# Patient Record
Sex: MALE | Race: WHITE | NOT HISPANIC OR LATINO | Employment: OTHER | URBAN - METROPOLITAN AREA
[De-identification: names, ages, dates, MRNs, and addresses within clinical notes are randomized per-mention and may not be internally consistent; named-entity substitution may affect disease eponyms.]

---

## 2017-02-27 RX ORDER — BENAZEPRIL HYDROCHLORIDE 40 MG/1
TABLET ORAL
Qty: 90 TAB | Refills: 0 | Status: SHIPPED | OUTPATIENT
Start: 2017-02-27 | End: 2017-05-15

## 2017-03-06 RX ORDER — BENAZEPRIL HYDROCHLORIDE 40 MG/1
TABLET ORAL
Qty: 90 TAB | Refills: 1 | Status: SHIPPED | OUTPATIENT
Start: 2017-03-06 | End: 2017-05-15

## 2017-03-06 RX ORDER — LEVOTHYROXINE SODIUM 175 UG/1
TABLET ORAL
Qty: 90 TAB | Refills: 1 | Status: SHIPPED | OUTPATIENT
Start: 2017-03-06 | End: 2017-05-15

## 2017-03-06 NOTE — TELEPHONE ENCOUNTER
Was the patient seen in the last year in this department? Yes   9/16    Does patient have an active prescription for medications requested? No     Received Request Via: Pharmacy

## 2017-03-20 RX ORDER — HYDROCHLOROTHIAZIDE 25 MG/1
TABLET ORAL
Qty: 90 TAB | Refills: 0 | Status: SHIPPED | OUTPATIENT
Start: 2017-03-20 | End: 2017-07-07 | Stop reason: SDUPTHER

## 2017-03-31 NOTE — Clinical Note
April 5, 2017        Lee Echols  [address] [facility] [address]        Dear Lee:    We have been unsuccessful reaching you by phone. We have approved your prescription for alprazolam for 30 days. Please call [phone]      If you have any questions or concerns, please don't hesitate to call.        Sincerely,        SARAH Chau.    Electronically Signed

## 2017-04-03 RX ORDER — ALPRAZOLAM 1 MG/1
TABLET ORAL
Qty: 30 TAB | Refills: 0 | Status: SHIPPED
Start: 2017-04-03 | End: 2017-12-24 | Stop reason: SDUPTHER

## 2017-04-05 NOTE — TELEPHONE ENCOUNTER
Phone number wrong in chart, deleted. Will mail letter to pt regarding appointment needed prior to refills

## 2017-05-15 ENCOUNTER — PATIENT OUTREACH (OUTPATIENT)
Dept: HEALTH INFORMATION MANAGEMENT | Facility: OTHER | Age: 72
End: 2017-05-15

## 2017-05-15 NOTE — PROGRESS NOTES
Outbound call to patient for medication reconciliation.  Updated allergy and medication lists.    Patient demonstrates adherence to medication schedule and understanding of indications for medications.    Meds that meet Beer's criteria for potentially inappropriate use in patients over 65 include: alprazolam.  Patient states he takes this medication on an average of once every 2 weeks.  Denies dizziness, drowsiness, or recent falls.  Encouraged patient to minimize use and to look to d/c this medication if any problems with cognitive impairment or increased fall risk.    Patient denies any side effects from medications. He stopped taking atorvastatin due to muscle pain.  States he did not experience muscle pain while taking Crestor.  Is amenable to starting Crestor again.    Has appropriate medication regimen for current disease states.  Patient does not monitor BP at home.      Patient feels satisfied with medication regimen.      Patient can afford medications.    Patient does not use tobacco products or consume alcohol.    Does not exercise regularly.  Encouraged him to do so at least 3 times weekly.    Educated patient on s/sx of high and low BP and when to monitor.  Patient understands when to seek medical attention.    Plan:  Patient to speak with PCP at upcoming office visit regarding restarting statin therapy.

## 2017-07-07 ENCOUNTER — OFFICE VISIT (OUTPATIENT)
Dept: MEDICAL GROUP | Facility: PHYSICIAN GROUP | Age: 72
End: 2017-07-07
Payer: MEDICARE

## 2017-07-07 ENCOUNTER — HOSPITAL ENCOUNTER (OUTPATIENT)
Dept: LAB | Facility: MEDICAL CENTER | Age: 72
End: 2017-07-07
Attending: NURSE PRACTITIONER
Payer: MEDICARE

## 2017-07-07 VITALS
BODY MASS INDEX: 30.21 KG/M2 | SYSTOLIC BLOOD PRESSURE: 112 MMHG | HEART RATE: 68 BPM | RESPIRATION RATE: 12 BRPM | DIASTOLIC BLOOD PRESSURE: 70 MMHG | OXYGEN SATURATION: 97 % | HEIGHT: 69 IN | WEIGHT: 204 LBS | TEMPERATURE: 97.7 F

## 2017-07-07 DIAGNOSIS — I10 ESSENTIAL HYPERTENSION: ICD-10-CM

## 2017-07-07 DIAGNOSIS — N52.9 ERECTILE DYSFUNCTION, UNSPECIFIED ERECTILE DYSFUNCTION TYPE: ICD-10-CM

## 2017-07-07 DIAGNOSIS — E78.5 HYPERLIPIDEMIA, UNSPECIFIED HYPERLIPIDEMIA TYPE: ICD-10-CM

## 2017-07-07 DIAGNOSIS — E55.9 VITAMIN D DEFICIENCY: ICD-10-CM

## 2017-07-07 DIAGNOSIS — G47.00 INSOMNIA, UNSPECIFIED TYPE: ICD-10-CM

## 2017-07-07 DIAGNOSIS — E07.9 THYROID DISEASE: ICD-10-CM

## 2017-07-07 DIAGNOSIS — R68.82 LOW LIBIDO: ICD-10-CM

## 2017-07-07 LAB
25(OH)D3 SERPL-MCNC: 23 NG/ML (ref 30–100)
ALBUMIN SERPL BCP-MCNC: 3.9 G/DL (ref 3.2–4.9)
ALBUMIN/GLOB SERPL: 1.4 G/DL
ALP SERPL-CCNC: 60 U/L (ref 30–99)
ALT SERPL-CCNC: 24 U/L (ref 2–50)
ANION GAP SERPL CALC-SCNC: 3 MMOL/L (ref 0–11.9)
AST SERPL-CCNC: 26 U/L (ref 12–45)
BILIRUB SERPL-MCNC: 1.3 MG/DL (ref 0.1–1.5)
BUN SERPL-MCNC: 17 MG/DL (ref 8–22)
CALCIUM SERPL-MCNC: 9.2 MG/DL (ref 8.5–10.5)
CHLORIDE SERPL-SCNC: 105 MMOL/L (ref 96–112)
CHOLEST SERPL-MCNC: 170 MG/DL (ref 100–199)
CO2 SERPL-SCNC: 23 MMOL/L (ref 20–33)
CREAT SERPL-MCNC: 1.06 MG/DL (ref 0.5–1.4)
GFR SERPL CREATININE-BSD FRML MDRD: >60 ML/MIN/1.73 M 2
GLOBULIN SER CALC-MCNC: 2.8 G/DL (ref 1.9–3.5)
GLUCOSE SERPL-MCNC: 94 MG/DL (ref 65–99)
HDLC SERPL-MCNC: 38 MG/DL
LDLC SERPL CALC-MCNC: 109 MG/DL
POTASSIUM SERPL-SCNC: 3.8 MMOL/L (ref 3.6–5.5)
PROT SERPL-MCNC: 6.7 G/DL (ref 6–8.2)
SODIUM SERPL-SCNC: 131 MMOL/L (ref 135–145)
T4 FREE SERPL-MCNC: 1.09 NG/DL (ref 0.53–1.43)
TRIGL SERPL-MCNC: 114 MG/DL (ref 0–149)
TSH SERPL DL<=0.005 MIU/L-ACNC: 0.12 UIU/ML (ref 0.3–3.7)

## 2017-07-07 PROCEDURE — 84443 ASSAY THYROID STIM HORMONE: CPT

## 2017-07-07 PROCEDURE — 80061 LIPID PANEL: CPT

## 2017-07-07 PROCEDURE — 82306 VITAMIN D 25 HYDROXY: CPT

## 2017-07-07 PROCEDURE — 99214 OFFICE O/P EST MOD 30 MIN: CPT | Performed by: NURSE PRACTITIONER

## 2017-07-07 PROCEDURE — 36415 COLL VENOUS BLD VENIPUNCTURE: CPT

## 2017-07-07 PROCEDURE — 84439 ASSAY OF FREE THYROXINE: CPT

## 2017-07-07 PROCEDURE — 80053 COMPREHEN METABOLIC PANEL: CPT

## 2017-07-07 RX ORDER — HYDROCHLOROTHIAZIDE 25 MG/1
TABLET ORAL
Qty: 90 TAB | Refills: 1 | Status: SHIPPED | OUTPATIENT
Start: 2017-07-07 | End: 2017-12-20 | Stop reason: SDUPTHER

## 2017-07-07 RX ORDER — SILDENAFIL 100 MG/1
100 TABLET, FILM COATED ORAL PRN
Qty: 10 TAB | Refills: 3 | Status: SHIPPED
Start: 2017-07-07 | End: 2018-04-06 | Stop reason: SDUPTHER

## 2017-07-07 NOTE — MR AVS SNAPSHOT
"        Lee FALCON Onesimo   2017 9:20 AM   Office Visit   MRN: [id number]    Department:  [facility]   Dept Phone:  [phone]    Description:  Male : 1945   Provider:  LORENZA Chau           Allergies as of 2017     No Known Allergies      You were diagnosed with     Insomnia, unspecified type   [8680186]       Essential hypertension   [5746763]       Vitamin D deficiency   [3934116]       Hyperlipidemia, unspecified hyperlipidemia type   [2975839]       Low libido   [043707]       Erectile dysfunction, unspecified erectile dysfunction type   [9939020]       Thyroid disease   [316535]         Vital Signs     Blood Pressure Pulse Temperature Respirations Height Weight    112/70 mmHg 68 36.5 °C (97.7 °F) 12 1.753 m (5' 9.02\") 92.534 kg (204 lb)    Body Mass Index Oxygen Saturation Smoking Status             30.11 kg/m2 97% Never Smoker          Basic Information     Date Of Birth Sex Race Ethnicity Preferred Language    1945 Male White Non- English      Problem List              ICD-10-CM Priority Class Noted - Resolved    Insomnia G47.00 High  Unknown - Present    Hypertension I10 High  Unknown - Present    Thyroid disease E07.9 High  Unknown - Present    Low libido R68.82 Low  7/10/2014 - Present    CRI (chronic renal insufficiency) N18.9 High  2014 - Present    Vitamin D deficiency E55.9 High  2014 - Present    Lumbar disc herniation with radiculopathy M51.16 Low  2015 - Present    Hyperlipidemia E78.5   1/15/2016 - Present    Wellness examination Z00.00   2016 - Present      Health Maintenance        Date Due Completion Dates    IMM DTaP/Tdap/Td Vaccine (1 - Tdap) 1965 ---    IMM ZOSTER VACCINE 2006 ---    IMM PNEUMOCOCCAL 65+ (ADULT) LOW/MEDIUM RISK SERIES (1 of 2 - PCV13) 2011 ---    IMM INFLUENZA (1) 2017 ---    COLONOSCOPY 7/15/2019 7/15/2014            Current Immunizations     No immunizations on file.      Below and/or " attached are the medications your provider expects you to take. Review all of your home medications and newly ordered medications with your provider and/or pharmacist. Follow medication instructions as directed by your provider and/or pharmacist. Please keep your medication list with you and share with your provider. Update the information when medications are discontinued, doses are changed, or new medications (including over-the-counter products) are added; and carry medication information at all times in the event of emergency situations     Allergies:  No Known Allergies          Medications  Valid as of: July 07, 2017 - 10:33 AM    Generic Name Brand Name Tablet Size Instructions for use    ALPRAZolam (Tab) XANAX 1 MG TAKE ONE TABLET BY MOUTH AT BEDTIME AS NEEDED        AmLODIPine Besylate (Tab) NORVASC 5 MG Take 1 Tab by mouth every day.        Benazepril HCl (Tab) LOTENSIN 40 MG Take 1 Tab by mouth every day.        Colchicine (Tab) COLCRYS 0.6 MG Take 1 Tab by mouth 2 times a day.        Gabapentin (Cap) NEURONTIN 300 MG Take 1 Cap by mouth 3 times a day.        HydroCHLOROthiazide (Tab) HYDRODIURIL 25 MG TAKE ONE TABLET BY MOUTH ONCE DAILY        Indomethacin   Take  by mouth.        Levothyroxine Sodium (Tab) SYNTHROID 175 MCG Take 1 Tab by mouth every day.        Sildenafil Citrate (Tab) VIAGRA 100 MG Take 1 Tab by mouth as needed for Erectile Dysfunction.        TraMADol HCl   Take  by mouth.        .                 Medicines prescribed today were sent to:     [facility] [address] [place] [address] - [address] [place] [id number]    Phone: [phone] Fax: [phone]    Open 24 Hours?: No      Medication refill instructions:       If your prescription bottle indicates you have medication refills left, it is not necessary to call your provider’s office. Please contact your pharmacy and they will refill your medication.    If your prescription bottle indicates you  do not have any refills left, you may request refills at any time through one of the following ways: The online [employer] system (except Urgent Care), by calling your provider’s office, or by asking your pharmacy to contact your provider’s office with a refill request. Medication refills are processed only during regular business hours and may not be available until the next business day. Your provider may request additional information or to have a follow-up visit with you prior to refilling your medication.   *Please Note: Medication refills are assigned a new Rx number when refilled electronically. Your pharmacy may indicate that no refills were authorized even though a new prescription for the same medication is available at the pharmacy. Please request the medicine by name with the pharmacy before contacting your provider for a refill.        Your To Do List     Future Labs/Procedures Complete By Expires    COMP METABOLIC PANEL  As directed 7/7/2018    VITAMIN D,25 HYDROXY  As directed 7/8/2018         [employer] Access Code: Activation code not generated  Current [employer] Status: Active

## 2017-07-07 NOTE — PROGRESS NOTES
No chief complaint on file.      HISTORY OF PRESENT ILLNESS: Patient is a @ age 71 here  today to discuss:    Interval history:     Hospitalizations NO     Injuries NO     Illness  NO     Review of Systems   Constitutional: Negative for fever, chills, weight loss and malaise/fatigue.   HENT: Negative for ear pain, nosebleeds, congestion, sore throat and neck pain.    Eyes: Negative for blurred vision.   Respiratory: Negative for cough, sputum production, shortness of breath and wheezing.    Cardiovascular: Negative for chest pain, palpitations, orthopnea and leg swelling.   Gastrointestinal: Negative for heartburn, nausea, vomiting and abdominal pain.   Genitourinary: Negative for dysuria, urgency and frequency.   Musculoskeletal: Negative for myalgias, back pain and joint pain.   Skin: Negative for rash and itching.   Neurological: Negative for dizziness, tingling, tremors, sensory change, focal weakness and headaches.   Endo/Heme/Allergies: Does not bruise/bleed easily.   Psychiatric/Behavioral: Negative for depression, anxiety, or memory loss.   Patient states that he has been more anxious and stress due to one of his adult children's health.          Insomnia  Patient has chronic insomnia. Patient reports chronic problems with this over time. He can get to sleep but his sleep is never really good quality we discussed some options. At one time he was taking Xanax that supplements his ability to sleep and doesn't feel hung over.     Hypertension  Patient has history of htn. Denies chest pain, shortness of breath, syncope headache or blurred vision. Patient is due for labs for kidney function and obtaining new prescription.     Vitamin D deficiency  Patient has history of vitamin D supplementation. We'll check     Hyperlipidemia  Patient has known Hyperlipidemia. Patient manages it with diet.    Low libido  Patient continue to have ED. He supplements with Medication.     Thyroid disease  Patient has chronic  "hypothyroidism. Patient needs to update her labs. Patient is on 175 mcg. no mental fog. No changes in skin, hair, nails.      Allergies:Review of patient's allergies indicates no known allergies.    Current Outpatient Prescriptions Ordered in [facility]   Medication Sig Dispense Refill   • TRAMADOL HCL PO Take  by mouth.     • INDOMETHACIN PO Take  by mouth.     • hydrochlorothiazide (HYDRODIURIL) 25 MG Tab TAKE ONE TABLET BY MOUTH ONCE DAILY 90 Tab 1   • sildenafil citrate (VIAGRA) 100 MG tablet Take 1 Tab by mouth as needed for Erectile Dysfunction. 10 Tab 3   • alprazolam (XANAX) 1 MG Tab TAKE ONE TABLET BY MOUTH AT BEDTIME AS NEEDED 30 Tab 0   • colchicine (COLCRYS) 0.6 MG Tab Take 1 Tab by mouth 2 times a day. 60 Tab 1   • benazepril (LOTENSIN) 40 MG tablet Take 1 Tab by mouth every day. 90 Tab 3   • amlodipine (NORVASC) 5 MG Tab Take 1 Tab by mouth every day. 90 Tab 0   • levothyroxine (SYNTHROID) 175 MCG Tab Take 1 Tab by mouth every day. 90 Tab 3   • gabapentin (NEURONTIN) 300 MG CAPS Take 1 Cap by mouth 3 times a day. 90 Cap 3     No current Epic-ordered facility-administered medications on file.       Past Medical History   Diagnosis Date   • Insomnia    • Hypertension      on meds   • Thyroid disease      on meds       Social History   Substance Use Topics   • Smoking status: Never Smoker    • Smokeless tobacco: Never Used   • Alcohol Use: No       Family Status   Relation Status Death Age   • Mother Alive    • Father       Family History   Problem Relation Age of Onset   • Cancer Father        ROS: As documented in my HPI      Exam:  Blood pressure 112/70, pulse 68, temperature 36.5 °C (97.7 °F), resp. rate 12, height 1.753 m (5' 9.02\"), weight 92.534 kg (204 lb), SpO2 97 %.  General:  Well nourished, well developed male in NAD  Head: Nontender scalp. No lesions  Skin: Some mild hyperpigmentation  Neck: Supple. Symmetric Thyroid palpated. No bruits  Pulmonary:  Normal effort. No rales, ronchi, or " wheezing.  Cardiovascular: Regular rate and rhythm without murmur.   Abdomen: Soft nontender, normal bowel sounds  Extremities: no clubbing, cyanosis, or edema.  Psych: Alert and oriented x3.  Neurological: No focal deficits    Please note that this dictation was created using voice recognition software. I have made every reasonable attempt to correct obvious errors, but I expect that there are errors of grammar and possibly content that I did not discover before finalizing the note.    Assessment/Plan:  1. Insomnia, unspecified type - chronic problem fair control  COMP METABOLIC PANEL    LIPID PANEL    VITAMIN D,25 HYDROXY    TSH+FREE T4   2. Essential hypertension - Current status of condition is chronic and controlled on therapy.   COMP METABOLIC PANEL    LIPID PANEL    VITAMIN D,25 HYDROXY    TSH+FREE T4   3. Vitamin D deficiency - unknown will recheck labs  COMP METABOLIC PANEL    LIPID PANEL    VITAMIN D,25 HYDROXY    TSH+FREE T4   4. Hyperlipidemia, unspecified hyperlipidemia type -unknown status recheck labs  COMP METABOLIC PANEL    LIPID PANEL    VITAMIN D,25 HYDROXY    TSH+FREE T4   5. Low libido - Current status of condition is chronic and controlled on therapy.   sildenafil citrate (VIAGRA) 100 MG tablet         Thyroid disease  Current status of condition is chronic and controlled on therapy.  Recheck lab      Patient will be due for his colonoscopy screen next year he may reconsidered we discussed different options which would include the stool test.

## 2017-09-05 RX ORDER — LEVOTHYROXINE SODIUM 175 UG/1
TABLET ORAL
Qty: 90 TAB | Refills: 1 | Status: SHIPPED | OUTPATIENT
Start: 2017-09-05 | End: 2018-03-01 | Stop reason: SDUPTHER

## 2017-09-05 NOTE — TELEPHONE ENCOUNTER
Was the patient seen in the last year in this department? Yes     Does patient have an active prescription for medications requested? No     Received Request Via: Pharmacy      Pt met protocol?: Yes, OV and TSH checked 7/17

## 2017-09-05 NOTE — TELEPHONE ENCOUNTER
Patient was last seen by PCP 7-7-17. Last TSH read 0.120 (7-7-17). Will refill for 6 months.  Elgin Ruiz, PharmD

## 2017-09-10 RX ORDER — BENAZEPRIL HYDROCHLORIDE 40 MG/1
TABLET ORAL
Qty: 90 TAB | Refills: 1 | Status: SHIPPED | OUTPATIENT
Start: 2017-09-10 | End: 2018-05-27 | Stop reason: SDUPTHER

## 2017-09-11 NOTE — TELEPHONE ENCOUNTER
Refill X 6 months, sent to pharmacy.Pt. Seen in the last 6 months per protocol.   Lab Results   Component Value Date/Time    SODIUM 131 (L) 07/07/2017 10:10 AM    POTASSIUM 3.8 07/07/2017 10:10 AM    CHLORIDE 105 07/07/2017 10:10 AM    CO2 23 07/07/2017 10:10 AM    GLUCOSE 94 07/07/2017 10:10 AM    BUN 17 07/07/2017 10:10 AM    CREATININE 1.06 07/07/2017 10:10 AM

## 2017-12-20 RX ORDER — HYDROCHLOROTHIAZIDE 25 MG/1
TABLET ORAL
Qty: 90 TAB | Refills: 1 | Status: SHIPPED | OUTPATIENT
Start: 2017-12-20 | End: 2018-05-27 | Stop reason: SDUPTHER

## 2017-12-24 DIAGNOSIS — G47.00 INSOMNIA, UNSPECIFIED TYPE: ICD-10-CM

## 2017-12-27 RX ORDER — ALPRAZOLAM 1 MG/1
TABLET ORAL
Qty: 30 TAB | Refills: 0 | Status: SHIPPED
Start: 2017-12-27 | End: 2018-08-09 | Stop reason: SDUPTHER

## 2018-03-06 ENCOUNTER — PATIENT OUTREACH (OUTPATIENT)
Dept: HEALTH INFORMATION MANAGEMENT | Facility: OTHER | Age: 73
End: 2018-03-06

## 2018-03-06 NOTE — PROGRESS NOTES
1. Attempt #: 1 * did not speak with patient*    2. [facility] Verified: yes    3. Verify PCP: yes    4. Care Team Updated:       •   DME Company (gait device, O2, CPAP, etc.): YES       •   Other Specialists (eye doctor, derm, GYN, cardiology, endo, etc): N\A    5.  Reviewed/Updated the following with patient:       •   Communication Preference Obtained? NO       •   Preferred Pharmacy? NO       •   Preferred Lab? NO       •   Family History (document living status of immediate family members and if + hx of cancer, diabetes, hypertension, hyperlipidemia, heart attack, stroke) NO    6. [employer] Activation: already active    7. [employer] Scott: no    8. Annual Wellness Visit Scheduling  Scheduling Status:Scheduled      9. Care Gap Scheduling (Attempt to Schedule EACH Overdue Care Gap!)     Health Maintenance Due   Topic Date Due   • IMM DTaP/Tdap/Td Vaccine (1 - Tdap) 09/22/1965   • IMM ZOSTER VACCINE  09/22/2006   • IMM PNEUMOCOCCAL 65+ (ADULT) LOW/MEDIUM RISK SERIES (1 of 2 - PCV13) 09/22/2011   • Annual Wellness Visit  07/11/2015   • IMM INFLUENZA (1) 09/01/2017        Scheduled patient for Annual Wellness Visit    10. Patient was advised: “This is a free wellness visit. The provider will screen for medical conditions to help you stay healthy. If you have other concerns to address you may be asked to discuss these at a separate visit or there may be an additional fee.”     11. Patient was informed to arrive 15 min prior to their scheduled appointment and bring in their medication bottles.

## 2018-04-06 ENCOUNTER — OFFICE VISIT (OUTPATIENT)
Dept: MEDICAL GROUP | Facility: PHYSICIAN GROUP | Age: 73
End: 2018-04-06
Payer: MEDICARE

## 2018-04-06 ENCOUNTER — HOSPITAL ENCOUNTER (OUTPATIENT)
Dept: LAB | Facility: MEDICAL CENTER | Age: 73
End: 2018-04-06
Attending: NURSE PRACTITIONER
Payer: MEDICARE

## 2018-04-06 VITALS
HEART RATE: 71 BPM | BODY MASS INDEX: 28.92 KG/M2 | WEIGHT: 202 LBS | SYSTOLIC BLOOD PRESSURE: 112 MMHG | TEMPERATURE: 97.6 F | DIASTOLIC BLOOD PRESSURE: 64 MMHG | HEIGHT: 70 IN | OXYGEN SATURATION: 97 % | RESPIRATION RATE: 14 BRPM

## 2018-04-06 DIAGNOSIS — R68.82 LOW LIBIDO: ICD-10-CM

## 2018-04-06 DIAGNOSIS — E07.9 THYROID DISEASE: ICD-10-CM

## 2018-04-06 DIAGNOSIS — E55.9 VITAMIN D DEFICIENCY: ICD-10-CM

## 2018-04-06 DIAGNOSIS — E78.5 HYPERLIPIDEMIA, UNSPECIFIED HYPERLIPIDEMIA TYPE: ICD-10-CM

## 2018-04-06 DIAGNOSIS — I10 ESSENTIAL HYPERTENSION: ICD-10-CM

## 2018-04-06 DIAGNOSIS — Z79.891 CHRONIC USE OF OPIATE DRUGS THERAPEUTIC PURPOSES: ICD-10-CM

## 2018-04-06 DIAGNOSIS — G47.00 INSOMNIA, UNSPECIFIED TYPE: ICD-10-CM

## 2018-04-06 DIAGNOSIS — M51.16 LUMBAR DISC HERNIATION WITH RADICULOPATHY: ICD-10-CM

## 2018-04-06 DIAGNOSIS — M10.09 IDIOPATHIC GOUT OF MULTIPLE SITES, UNSPECIFIED CHRONICITY: ICD-10-CM

## 2018-04-06 DIAGNOSIS — Z00.00 WELLNESS EXAMINATION: ICD-10-CM

## 2018-04-06 LAB
25(OH)D3 SERPL-MCNC: 17 NG/ML (ref 30–100)
ALBUMIN SERPL BCP-MCNC: 4.7 G/DL (ref 3.2–4.9)
ALBUMIN/GLOB SERPL: 1.6 G/DL
ALP SERPL-CCNC: 53 U/L (ref 30–99)
ALT SERPL-CCNC: 30 U/L (ref 2–50)
ANION GAP SERPL CALC-SCNC: 10 MMOL/L (ref 0–11.9)
AST SERPL-CCNC: 28 U/L (ref 12–45)
BILIRUB SERPL-MCNC: 1.4 MG/DL (ref 0.1–1.5)
BUN SERPL-MCNC: 19 MG/DL (ref 8–22)
CALCIUM SERPL-MCNC: 10.5 MG/DL (ref 8.5–10.5)
CHLORIDE SERPL-SCNC: 103 MMOL/L (ref 96–112)
CHOLEST SERPL-MCNC: 182 MG/DL (ref 100–199)
CO2 SERPL-SCNC: 24 MMOL/L (ref 20–33)
CREAT SERPL-MCNC: 1.27 MG/DL (ref 0.5–1.4)
GLOBULIN SER CALC-MCNC: 3 G/DL (ref 1.9–3.5)
GLUCOSE SERPL-MCNC: 98 MG/DL (ref 65–99)
HDLC SERPL-MCNC: 49 MG/DL
LDLC SERPL CALC-MCNC: 111 MG/DL
POTASSIUM SERPL-SCNC: 3.9 MMOL/L (ref 3.6–5.5)
PROT SERPL-MCNC: 7.7 G/DL (ref 6–8.2)
SODIUM SERPL-SCNC: 137 MMOL/L (ref 135–145)
TRIGL SERPL-MCNC: 110 MG/DL (ref 0–149)

## 2018-04-06 PROCEDURE — G0439 PPPS, SUBSEQ VISIT: HCPCS | Performed by: NURSE PRACTITIONER

## 2018-04-06 PROCEDURE — 80053 COMPREHEN METABOLIC PANEL: CPT

## 2018-04-06 PROCEDURE — 80061 LIPID PANEL: CPT

## 2018-04-06 PROCEDURE — 36415 COLL VENOUS BLD VENIPUNCTURE: CPT

## 2018-04-06 PROCEDURE — 82306 VITAMIN D 25 HYDROXY: CPT

## 2018-04-06 RX ORDER — AMLODIPINE BESYLATE 5 MG/1
5 TABLET ORAL DAILY
Qty: 90 TAB | Refills: 1 | Status: SHIPPED
Start: 2018-04-06 | End: 2018-08-09 | Stop reason: SDUPTHER

## 2018-04-06 RX ORDER — COLCHICINE 0.6 MG/1
0.6 TABLET ORAL 2 TIMES DAILY
Qty: 180 TAB | Refills: 1 | Status: SHIPPED
Start: 2018-04-06 | End: 2018-08-09 | Stop reason: SDUPTHER

## 2018-04-06 RX ORDER — ALPRAZOLAM 1 MG/1
1 TABLET ORAL NIGHTLY PRN
COMMUNITY
End: 2018-08-09

## 2018-04-06 RX ORDER — TRAMADOL HYDROCHLORIDE 50 MG/1
TABLET ORAL
Qty: 60 TAB | Refills: 0 | Status: SHIPPED | OUTPATIENT
Start: 2018-04-06 | End: 2018-06-06

## 2018-04-06 RX ORDER — SILDENAFIL 100 MG/1
100 TABLET, FILM COATED ORAL PRN
Qty: 10 TAB | Refills: 3 | Status: SHIPPED
Start: 2018-04-06 | End: 2018-08-09 | Stop reason: SDUPTHER

## 2018-04-06 ASSESSMENT — ACTIVITIES OF DAILY LIVING (ADL): BATHING_REQUIRES_ASSISTANCE: 0

## 2018-04-06 ASSESSMENT — PATIENT HEALTH QUESTIONNAIRE - PHQ9: CLINICAL INTERPRETATION OF PHQ2 SCORE: 0

## 2018-04-06 NOTE — ASSESSMENT & PLAN NOTE
Patient has occasionally uses Tramadol for his back pain.   Patient notices that when he lifts. Patient would like to restart having pain medication. This patient is continuing to use a controlled substance (CS) on a long term basis.  The patient is thoroughly aware of the goals of treatment with the CS  The patient is aware that yearly and random urine drug screens are required.  The patient has been instructed to take the CS only as prescribed.  The patient is prohibited from sharing the CS with any other person.  The patient is instructed to inform the provider if any other CS is taken, of any alcohol or cannabis or other recreational drug use, any treatment for side effects of the CS or complications, if they have CS active rx in other states  The patient has evidence for a reason for the CS  The treatment plan has been discussed with the patient  The  report has been reviewed    Chronic pain recheck:   Last dose of controlled substance: 2 days ago   Chronic pain treated with  Tramadol 50 mg  taken once every 3  days    He  reports that he does not drink alcohol.  He  reports that he does not use drugs.    Consequences of Chronic Opiate therapy:  (5 A's)  Analgesia: Compared to no treatment or prior treatment, pain is currently significantly improved  Activity: significantly improved  Adverse Events: He denies constipation, dry mouth, itchy skin, nausea and sedation  Aberrant Behaviors: He reports he is taking medication as prescribed and is not veering from agreed treatment regimen. There have been no inappropriate refills or lost/stolen meds reported.   Affect/Mood: Pain is not impacting patient's mood.  Patient denies depression/anxiety.    Nonnarcotic treatments that are being used: muscle relaxers.   Treatment goals discussed.      Interpretation of Opioid Risk Score   Score 0-3 = Low risk of abuse. Do UDS at least once per year.  Score 4-7 = Moderate risk of abuse. Do UDS 1-4 times per year.  Score 8+ =  High risk of abuse. Refer to specialist.    No order of CONTROLLED SUBSTANCE TREATMENT AGREEMENT is found.     UDS Summary     Patient has no health maintenance due at this time        Patient to obtain UDS today.     I have reviewed the medical records, the Prescription Monitoring Program and I have determined that controlled substance treatment is medically indicated.

## 2018-04-06 NOTE — PROGRESS NOTES
Chief Complaint   Patient presents with   • Annual Exam         HPI:  Lee is a 72 y.o. here for Medicare Annual Wellness Visit    Patient initially here for wellness exam but needs to obtain refills on medication for hypertension, gout, and ED. He also would like to restart his chronic use of opioids for therapeutic purposes in regard of his low back pain and history of herniated disks.    Insomnia  Patient has chronic insomnia. He manages insomnia with Xanax the past that he will no longer take this that he is chosen to use tramadol for pain relief. Patient counseled on the increase of accidental overdose with the combination of benzoyl at diet is a PA and narcotics.    Hypertension  Patient has chronic hypertension and takes Norvasc 5 mg, Lotensin 40, and hydrochlorothiazide 25. No chest pain. No shortness of breath. No peripheral edema. Is due for chemistry and lipid profile.    Vitamin D deficiency  Patient has a history of vitamin D insufficiency. Patient supplements.    Hyperlipidemia  Patient has chronic hyperlipidemia. He currently is not on a statin. Will check lipid profile.    Low libido  Patient has chronic ED.    Lumbar disc herniation with radiculopathy  Patient has occasionally uses Tramadol for his back pain.   Patient notices that when he lifts. Patient would like to restart having pain medication. This patient is continuing to use a controlled substance (CS) on a long term basis.  The patient is thoroughly aware of the goals of treatment with the CS  The patient is aware that yearly and random urine drug screens are required.  The patient has been instructed to take the CS only as prescribed.  The patient is prohibited from sharing the CS with any other person.  The patient is instructed to inform the provider if any other CS is taken, of any alcohol or cannabis or other recreational drug use, any treatment for side effects of the CS or complications, if they have CS active rx in other  states  The patient has evidence for a reason for the CS  The treatment plan has been discussed with the patient  The  report has been reviewed    Chronic pain recheck:   Last dose of controlled substance: 2 days ago   Chronic pain treated with  Tramadol 50 mg  taken once every 3  days    He  reports that he does not drink alcohol.  He  reports that he does not use drugs.    Consequences of Chronic Opiate therapy:  (5 A's)  Analgesia: Compared to no treatment or prior treatment, pain is currently significantly improved  Activity: significantly improved  Adverse Events: He denies constipation, dry mouth, itchy skin, nausea and sedation  Aberrant Behaviors: He reports he is taking medication as prescribed and is not veering from agreed treatment regimen. There have been no inappropriate refills or lost/stolen meds reported.   Affect/Mood: Pain is not impacting patient's mood.  Patient denies depression/anxiety.    Nonnarcotic treatments that are being used: muscle relaxers.   Treatment goals discussed.      Interpretation of Opioid Risk Score    0  Score 0-3 = Low risk of abuse. Do UDS at least once per year.  Score 4-7 = Moderate risk of abuse. Do UDS 1-4 times per year.  Score 8+ = High risk of abuse. Refer to specialist.    No order of CONTROLLED SUBSTANCE TREATMENT AGREEMENT is found.     UDS Summary     Patient has no health maintenance due at this time        Patient to obtain UDS today.     I have reviewed the medical records, the Prescription Monitoring Program and I have determined that controlled substance treatment is medically indicated.            Patient Active Problem List    Diagnosis Date Noted   • CRI (chronic renal insufficiency) 08/25/2014     Priority: High   • Vitamin D deficiency 08/25/2014     Priority: High   • Insomnia      Priority: High   • Hypertension      Priority: High   • Thyroid disease      Priority: High   • Lumbar disc herniation with radiculopathy 06/08/2015     Priority: Low    • Low libido 07/10/2014     Priority: Low   • Wellness examination 09/06/2016   • Hyperlipidemia 01/15/2016       Current Outpatient Prescriptions   Medication Sig Dispense Refill   • levothyroxine (SYNTHROID) 175 MCG Tab TAKE ONE TABLET BY MOUTH ONCE DAILY 90 Tab 0   • hydrochlorothiazide (HYDRODIURIL) 25 MG Tab TAKE ONE TABLET BY MOUTH ONCE DAILY 90 Tab 1   • benazepril (LOTENSIN) 40 MG tablet TAKE ONE TABLET BY MOUTH ONCE DAILY 90 Tab 1   • TRAMADOL HCL PO Take  by mouth.     • INDOMETHACIN PO Take  by mouth.     • sildenafil citrate (VIAGRA) 100 MG tablet Take 1 Tab by mouth as needed for Erectile Dysfunction. 10 Tab 3   • colchicine (COLCRYS) 0.6 MG Tab Take 1 Tab by mouth 2 times a day. 60 Tab 1   • amlodipine (NORVASC) 5 MG Tab Take 1 Tab by mouth every day. 90 Tab 0   • gabapentin (NEURONTIN) 300 MG CAPS Take 1 Cap by mouth 3 times a day. 90 Cap 3     No current facility-administered medications for this visit.         Patient is taking medications as noted in medication list.  Current supplements as per medication list.     Allergies: Patient has no known allergies.    Current social contact/activities: family friends/   Patient's perception of their health: good    Is patient current with immunizations?NO denied all immunizations    He  reports that he has never smoked. He has never used smokeless tobacco. He reports that he does not drink alcohol or use drugs.  Counseling given: Not Answered        DPA/Advanced directive: Patient has Advanced Directive, but it is not on file. Instructed to bring in a copy to scan into their chart.    ROS:    Gait: Uses no assistive device   Ostomy: no   Other tubes: no   Amputations: no   Chronic oxygen use no   Last eye exam declined   Wears hearing aids: no   : Denies any urinary leakage during the last 6 months      Screening:        Depression Screening    Little interest or pleasure in doing things?  0 - not at all  Feeling down, depressed, or hopeless? 0 - not  at all  Patient Health Questionnaire Score: 0    If depressive symptoms identified deferred to follow up visit unless specifically addressed in assessment and plan.    Interpretation of PHQ-9 Total Score   Score Severity   1-4 No Depression   5-9 Mild Depression   10-14 Moderate Depression   15-19 Moderately Severe Depression   20-27 Severe Depression    Screening for Cognitive Impairment    Three Minute Recall (apple, watch, dalton)  3/3    Draw clock face with all 12 numbers set to the hand to show 10 minutes past 11 o'clock  1    If cognitive concerns identified, deferred for follow up unless specifically addressed in assessment and plan.    Fall Risk Assessment    Has the patient had two or more falls in the last year or any fall with injury in the last year?  No  If fall risk identified, deferred for follow up unless specifically addressed in assessment and plan.    Safety Assessment    Throw rugs on floor.  No  Handrails on all stairs.  No  Good lighting in all hallways.  Yes  Difficulty hearing.  No  Patient counseled about all safety risks that were identified.    Functional Assessment ADLs    Are there any barriers preventing you from cooking for yourself or meeting nutritional needs?  No.    Are there any barriers preventing you from driving safely or obtaining transportation?  No.    Are there any barriers preventing you from using a telephone or calling for help?  No.    Are there any barriers preventing you from shopping?  No.    Are there any barriers preventing you from taking care of your own finances?  No.    Are there any barriers preventing you from managing your medications?  No.    Are there any barriers preventing you from showering, bathing or dressing yourself?  No.    Are you currently engaging any exercise or physical activity?  Yes.       Health Maintenance Summary                IMM DTaP/Tdap/Td Vaccine Overdue 9/22/1965     IMM ZOSTER VACCINE Overdue 9/22/2006     IMM PNEUMOCOCCAL 65+  (ADULT) LOW/MEDIUM RISK SERIES Overdue 9/22/2011     Annual Wellness Visit Overdue 7/11/2015      Done 7/10/2014     IMM INFLUENZA Next Due 9/1/2018     COLONOSCOPY Next Due 7/15/2019      Done 7/15/2014 AMB REFERRAL TO GI FOR COLONOSCOPY          Patient Care Team:  LORENZA Chau as PCP - General (Family Medicine)    Social History   Substance Use Topics   • Smoking status: Never Smoker   • Smokeless tobacco: Never Used   • Alcohol use No     Family History   Problem Relation Age of Onset   • Cancer Father      He  has a past medical history of Hypertension; Insomnia; and Thyroid disease.   No past surgical history on file.        Exam:     There were no vitals taken for this visit. There is no height or weight on file to calculate BMI.    Hearing good.    Dentition good  Alert, oriented in no acute distress.  Eye contact is good, speech goal directed, affect calm      Assessment and Plan. The following treatment and monitoring plan is recommended:    1. Insomnia, unspecified type  LIPID PROFILE    COMP METABOLIC PANEL    VITAMIN D,25 HYDROXY    TSH+FREE T4   2. Essential hypertension  LIPID PROFILE    COMP METABOLIC PANEL    VITAMIN D,25 HYDROXY    TSH+FREE T4   3. Vitamin D deficiency  LIPID PROFILE    COMP METABOLIC PANEL    VITAMIN D,25 HYDROXY    TSH+FREE T4   4. Hyperlipidemia, unspecified hyperlipidemia type  LIPID PROFILE    COMP METABOLIC PANEL    VITAMIN D,25 HYDROXY    TSH+FREE T4   5. Low libido  LIPID PROFILE    COMP METABOLIC PANEL    VITAMIN D,25 HYDROXY    TSH+FREE T4         Services suggested: No services needed at this time  Health Care Screening: Age-appropriate preventive services recommended by USPTF and ACIP covered by Medicare were discussed today. Services ordered if indicated and agreed upon by the patient.  Referrals offered: PT/OT/Nutrition counseling/Behavioral Health/Smoking cessation as per orders if indicated.    Discussion today about general wellness and lifestyle habits:     · Prevent falls and reduce trip hazards; Cautioned about securing or removing rugs.  · Have a working fire alarm and carbon monoxide detector;   · Engage in regular physical activity and social activities       Follow-up: Patient to have urine drug screen. Return to clinic for future refills on tramadol. Obtain labs. Will need to select a new provider as I'm retiring.

## 2018-07-11 NOTE — TELEPHONE ENCOUNTER
Was the patient seen in the last year in this department? Yes     Does patient have an active prescription for medications requested? No     Received Request Via: Pharmacy      Pt met protocol?: No pt last ov 4/18 was asked to have labs done last janine   TSH   Date Value Ref Range Status   07/07/2017 0.120 (L) 0.300 - 3.700 uIU/mL Final

## 2018-07-12 RX ORDER — LEVOTHYROXINE SODIUM 175 UG/1
TABLET ORAL
Qty: 15 TAB | Refills: 0 | Status: SHIPPED | OUTPATIENT
Start: 2018-07-12 | End: 2018-07-23 | Stop reason: SDUPTHER

## 2018-07-12 NOTE — TELEPHONE ENCOUNTER
Last seen by PCP 4/18. Will send #15 to pharmacy. Last fill. Must get labs done for thyroid. MA, Please call patient and remind him to get Thyroid labs done.

## 2018-07-17 ENCOUNTER — TELEPHONE (OUTPATIENT)
Dept: MEDICAL GROUP | Facility: CLINIC | Age: 73
End: 2018-07-17

## 2018-07-17 DIAGNOSIS — E07.9 THYROID DISEASE: ICD-10-CM

## 2018-07-17 NOTE — TELEPHONE ENCOUNTER
I am retired from [facility]. He needs to follow up with new PCP.  Last visit was April 2018.   What medications does he want refilled.   Marie Nathan

## 2018-07-17 NOTE — TELEPHONE ENCOUNTER
Pt is requesting all meds to be refilled and sent to [facility].   [facility] phone number: [phone]  Fax: [phone]    Pt phone: [phone]

## 2018-07-23 RX ORDER — LEVOTHYROXINE SODIUM 175 UG/1
TABLET ORAL
Qty: 30 TAB | Refills: 1 | Status: SHIPPED | OUTPATIENT
Start: 2018-07-23 | End: 2018-08-15 | Stop reason: SDUPTHER

## 2018-07-23 NOTE — TELEPHONE ENCOUNTER
Will send Rx to pharmacy. Please contact patient and have him get thyroid labs done prior to appointment, looks like this wasn't obtained by the lab as part of the April labs that kobi ordered.

## 2018-08-09 ENCOUNTER — OFFICE VISIT (OUTPATIENT)
Dept: MEDICAL GROUP | Facility: PHYSICIAN GROUP | Age: 73
End: 2018-08-09
Payer: MEDICARE

## 2018-08-09 VITALS
WEIGHT: 201.8 LBS | RESPIRATION RATE: 16 BRPM | OXYGEN SATURATION: 96 % | HEART RATE: 73 BPM | HEIGHT: 70 IN | SYSTOLIC BLOOD PRESSURE: 108 MMHG | DIASTOLIC BLOOD PRESSURE: 58 MMHG | TEMPERATURE: 97.6 F | BODY MASS INDEX: 28.89 KG/M2

## 2018-08-09 DIAGNOSIS — Z79.899 CHRONIC PRESCRIPTION BENZODIAZEPINE USE: ICD-10-CM

## 2018-08-09 DIAGNOSIS — Z87.39 HISTORY OF GOUT: ICD-10-CM

## 2018-08-09 DIAGNOSIS — E07.9 THYROID DISEASE: ICD-10-CM

## 2018-08-09 DIAGNOSIS — R68.82 LOW LIBIDO: ICD-10-CM

## 2018-08-09 DIAGNOSIS — M10.09 IDIOPATHIC GOUT OF MULTIPLE SITES, UNSPECIFIED CHRONICITY: ICD-10-CM

## 2018-08-09 DIAGNOSIS — G47.00 INSOMNIA, UNSPECIFIED TYPE: ICD-10-CM

## 2018-08-09 DIAGNOSIS — I10 ESSENTIAL HYPERTENSION: ICD-10-CM

## 2018-08-09 DIAGNOSIS — M51.16 LUMBAR DISC HERNIATION WITH RADICULOPATHY: ICD-10-CM

## 2018-08-09 DIAGNOSIS — Z79.891 CHRONIC USE OF OPIATE DRUGS THERAPEUTIC PURPOSES: ICD-10-CM

## 2018-08-09 PROCEDURE — 99214 OFFICE O/P EST MOD 30 MIN: CPT | Performed by: NURSE PRACTITIONER

## 2018-08-09 RX ORDER — SILDENAFIL 100 MG/1
100 TABLET, FILM COATED ORAL PRN
Qty: 10 TAB | Refills: 3 | Status: SHIPPED | OUTPATIENT
Start: 2018-08-09

## 2018-08-09 RX ORDER — GABAPENTIN 300 MG/1
300 CAPSULE ORAL 3 TIMES DAILY
Qty: 90 CAP | Refills: 3 | Status: SHIPPED | OUTPATIENT
Start: 2018-08-09

## 2018-08-09 RX ORDER — TRAMADOL HYDROCHLORIDE 50 MG/1
TABLET ORAL
Qty: 60 TAB | Refills: 0 | Status: CANCELLED | OUTPATIENT
Start: 2018-08-09 | End: 2018-10-09

## 2018-08-09 RX ORDER — COLCHICINE 0.6 MG/1
0.6 TABLET ORAL 2 TIMES DAILY
Qty: 30 TAB | Refills: 1 | Status: SHIPPED
Start: 2018-08-09 | End: 2018-08-09 | Stop reason: SDUPTHER

## 2018-08-09 RX ORDER — COLCHICINE 0.6 MG/1
0.6 TABLET ORAL 2 TIMES DAILY
Qty: 30 TAB | Refills: 1 | Status: SHIPPED | OUTPATIENT
Start: 2018-08-09 | End: 2019-02-05 | Stop reason: SDUPTHER

## 2018-08-09 RX ORDER — AMLODIPINE BESYLATE 5 MG/1
5 TABLET ORAL DAILY
Qty: 90 TAB | Refills: 1 | Status: SHIPPED
Start: 2018-08-09 | End: 2018-08-09 | Stop reason: SDUPTHER

## 2018-08-09 RX ORDER — AMLODIPINE BESYLATE 5 MG/1
5 TABLET ORAL DAILY
Qty: 90 TAB | Refills: 1 | Status: SHIPPED | OUTPATIENT
Start: 2018-08-09 | End: 2019-07-10 | Stop reason: SDUPTHER

## 2018-08-09 RX ORDER — SILDENAFIL 100 MG/1
100 TABLET, FILM COATED ORAL PRN
Qty: 10 TAB | Refills: 3 | Status: SHIPPED
Start: 2018-08-09 | End: 2018-08-09 | Stop reason: SDUPTHER

## 2018-08-09 RX ORDER — ALPRAZOLAM 1 MG/1
TABLET ORAL
Qty: 30 TAB | Refills: 0 | Status: SHIPPED | OUTPATIENT
Start: 2018-08-09 | End: 2018-09-09

## 2018-08-09 NOTE — ASSESSMENT & PLAN NOTE
Chronic health problem.  Usually manages with sleep hygiene.  Will occasionally take alprazolam as needed.  Last prescription for alprazolam was for 30 tabs 8 months ago.  Patient is requesting a refill today.  Patient also takes tramadol occasionally for lower back pain.  Patient knows not to take narcotic with benzodiazepine and knows to avoid alcohol while taking either medication.

## 2018-08-09 NOTE — PROGRESS NOTES
CC: Hypertension, insomnia, thyroid    HISTORY OF THE PRESENT ILLNESS: Patient is a 72 y.o. male. This pleasant patient is here today for evaluation, management, medication refills for insomnia, hypertension, hypothyroid, erectile dysfunction.  Patient is new to me.  Previous primary care provider was Marie WESLEY.      Insomnia  Chronic health problem.  Usually manages with sleep hygiene.  Will occasionally take alprazolam as needed.  Last prescription for alprazolam was for 30 tabs 8 months ago.  Patient is requesting a refill today.  Patient also takes tramadol occasionally for lower back pain.  Patient knows not to take narcotic with benzodiazepine and knows to avoid alcohol while taking either medication.    Hypertension  This is a chronic health problem that is well controlled with current medications and lifestyle measures.  Patient takes benazepril 40 mg, amlodipine 5 mg, and hydrochlorothiazide 25 mg daily.  Patient is requesting refill for amlodipine, needing a paper prescription as he mails prescription.The patient denies chest pain, shortness of breath, headache, epistaxis, vision changes, or dyspnea on exertion.  Tolerating medication, denies lightheadedness, cough, pedal edema.    Thyroid disease  This is a chronic health problem for patient, uncertain of control.  Last TSH was over a year ago and was low at 0.120.  He takes levothyroxine 175 mcg daily.  Will get updated TSH prior to next refill.    Low libido  Chronic ED.  Takes sildenafil.  Requesting refill.  Gets medication through mail-in pharmacy.    History of gout  Patient reports this is a chronic health problem.  Will take colchicine with gout flares.  Patient reports last gout flare was couple months ago.  Patient is requesting refill of colchicine.    Lumbar disc herniation with radiculopathy  This is a chronic health problem that is well controlled with current medications and lifestyle measures. Pain is in lower back.  Able to remain  active.  Patient takes tramadol 50 mg once every 3-4 days and gabapentin 300 mg daily.  Patient is requesting a refill of gabapentin.  He has been taking his wife's gabapentin as he ran out.  Patient last saw spine specialist at [facility] neurosurgery  3 years ago.      Allergies: Patient has no known allergies.    Current Outpatient Prescriptions Ordered in [facility]   Medication Sig Dispense Refill   • gabapentin (NEURONTIN) 300 MG Cap Take 1 Cap by mouth 3 times a day. 90 Cap 3   • ALPRAZolam (XANAX) 1 MG Tab TAKE ONE TABLET BY MOUTH AT BEDTIME AS NEEDED 30 Tab 0   • amLODIPine (NORVASC) 5 MG Tab Take 1 Tab by mouth every day. 90 Tab 1   • sildenafil citrate (VIAGRA) 100 MG tablet Take 1 Tab by mouth as needed for Erectile Dysfunction. 10 Tab 3   • colchicine (COLCRYS) 0.6 MG Tab Take 1 Tab by mouth 2 times a day. 30 Tab 1   • levothyroxine (SYNTHROID) 175 MCG Tab TAKE 1 TABLET BY MOUTH ONCE DAILY 30 Tab 1   • hydroCHLOROthiazide (HYDRODIURIL) 25 MG Tab TAKE ONE TABLET BY MOUTH ONCE DAILY 90 Tab 1   • benazepril (LOTENSIN) 40 MG tablet TAKE ONE TABLET BY MOUTH ONCE DAILY 90 Tab 1   • TRAMADOL HCL PO Take  by mouth.     • INDOMETHACIN PO Take  by mouth.       No current Epic-ordered facility-administered medications on file.        Past Medical History:   Diagnosis Date   • Hypertension     on meds   • Insomnia    • Thyroid disease     on meds       No past surgical history on file.    Social History   Substance Use Topics   • Smoking status: Never Smoker   • Smokeless tobacco: Never Used   • Alcohol use No       Family History   Problem Relation Age of Onset   • Cancer Father        ROS:     - Constitutional: Negative for fever, chills, unexpected weight change, and fatigue/generalized weakness.     - HEENT: Negative for headaches, vision changes, hearing changes, ear pain, rhinorrhea, sinus congestion, and sore throat.      - Respiratory: Negative for cough, dyspnea, and wheezing.      - Cardiovascular: Negative  "for chest pain, palpitations, orthopnea, and bilateral lower extremity edema.     - Gastrointestinal: Negative for abdominal pain.     - Genitourinary: Negative for dysuria.    - Musculoskeletal: As in HPI.     - Neurological: Negative for dizziness.     - Psychiatric/Behavioral: Negative for depression, suicidal/homicidal ideation and memory loss.           Exam: Blood pressure 108/58, pulse 73, temperature 36.4 °C (97.6 °F), resp. rate 16, height 1.778 m (5' 10\"), weight 91.5 kg (201 lb 12.8 oz), SpO2 96 %. Body mass index is 28.96 kg/m².    General: Alert, pleasant, well nourished, well developed male in NAD  HEENT: Normocephalic. Eyes conjunctiva clear lids without ptosis, pupils equal and reactive to light, ears normal shape and contour, canals are clear bilaterally, tympanic membranes are pearly gray with good light reflex, nasal mucosa without erythema and drainage, oropharynx is without erythema, edema or exudates.   Neck: Supple without bruit. Thyroid is not enlarged.  Pulmonary: Clear to ausculation.  Normal effort. No rales, ronchi, or wheezing.  Cardiovascular: Normal rate and rhythm without murmur. Carotid and radial pulses are intact and equal bilaterally.  No lower extremity edema.  Neurologic: Grossly nonfocal  Lymph: No cervical or supraclavicular lymph nodes are palpable  Skin: Warm and dry.  No obvious lesions.  Musculoskeletal: Normal gait.   Psych: Normal mood and affect. Alert and oriented. Judgment and insight is normal.    Please note that this dictation was created using voice recognition software. I have made every reasonable attempt to correct obvious errors, but I expect that there are errors of grammar and possibly content that I did not discover before finalizing the note.      Assessment/Plan  1. Lumbar disc herniation with radiculopathy  Continue with lifestyle measures, gabapentin, tramadol as needed.  - gabapentin (NEURONTIN) 300 MG Cap; Take 1 Cap by mouth 3 times a day.  Dispense: " 90 Cap; Refill: 3    2. Thyroid disease  Patient will get updated TSH.  Will refill levothyroxine, dosage pending TSH level.  - TSH; Future    3. Low libido    - sildenafil citrate (VIAGRA) 100 MG tablet; Take 1 Tab by mouth as needed for Erectile Dysfunction.  Dispense: 10 Tab; Refill: 3    4. Idiopathic gout of multiple sites, unspecified chronicity  Patient will continue with lifestyle measures.  Continue with colchicine for gout flares.  - colchicine (COLCRYS) 0.6 MG Tab; Take 1 Tab by mouth 2 times a day.  Dispense: 30 Tab; Refill: 1    5. Insomnia, unspecified type  Continue with lifestyle measures.  Continue with alprazolam as needed.  - ALPRAZolam (XANAX) 1 MG Tab; TAKE ONE TABLET BY MOUTH AT BEDTIME AS NEEDED  Dispense: 30 Tab; Refill: 0    6. Chronic prescription benzodiazepine use   reviewed and is consistent.  Reviewed risks of benzodiazepines and narcotics.  Patient verbalizes understanding that he will not take both alprazolam and tramadol in the same day, and he understands not to drink alcohol with either medications.  Patient understands the risks which were reviewed with him and he signed a controlled substance consent.  - Controlled Substance Treatment Agreement    7. Essential hypertension  - amLODIPine (NORVASC) 5 MG Tab; Take 1 Tab by mouth every day.  Dispense: 90 Tab; Refill: 1    Patient will return to clinic in 6 months or sooner if needed.

## 2018-08-09 NOTE — ASSESSMENT & PLAN NOTE
This is a chronic health problem that is well controlled with current medications and lifestyle measures. Pain is in lower back.  Able to remain active.  Patient takes tramadol 50 mg once every 3-4 days and gabapentin 300 mg daily.  Patient is requesting a refill of gabapentin.  He has been taking his wife's gabapentin as he ran out.  Patient last saw spine specialist at [facility] neurosurgery  3 years ago.

## 2018-08-09 NOTE — ASSESSMENT & PLAN NOTE
This is a chronic health problem for patient, uncertain of control.  Last TSH was over a year ago and was low at 0.120.  He takes levothyroxine 175 mcg daily.  Will get updated TSH prior to next refill.

## 2018-08-09 NOTE — ASSESSMENT & PLAN NOTE
Patient reports this is a chronic health problem.  Will take colchicine with gout flares.  Patient reports last gout flare was couple months ago.  Patient is requesting refill of colchicine.

## 2018-08-13 ENCOUNTER — HOSPITAL ENCOUNTER (OUTPATIENT)
Dept: LAB | Facility: MEDICAL CENTER | Age: 73
End: 2018-08-13
Attending: NURSE PRACTITIONER
Payer: MEDICARE

## 2018-08-13 DIAGNOSIS — E07.9 THYROID DISEASE: ICD-10-CM

## 2018-08-13 LAB — TSH SERPL DL<=0.005 MIU/L-ACNC: 0.07 UIU/ML (ref 0.38–5.33)

## 2018-08-13 PROCEDURE — 36415 COLL VENOUS BLD VENIPUNCTURE: CPT

## 2018-08-13 PROCEDURE — 84443 ASSAY THYROID STIM HORMONE: CPT

## 2018-08-15 ENCOUNTER — TELEPHONE (OUTPATIENT)
Dept: MEDICAL GROUP | Facility: PHYSICIAN GROUP | Age: 73
End: 2018-08-15

## 2018-08-15 DIAGNOSIS — E07.9 THYROID DISEASE: ICD-10-CM

## 2018-08-15 RX ORDER — LEVOTHYROXINE SODIUM 0.15 MG/1
TABLET ORAL
Qty: 90 TAB | Refills: 0 | Status: SHIPPED | OUTPATIENT
Start: 2018-08-15 | End: 2018-10-30 | Stop reason: SDUPTHER

## 2018-10-29 ENCOUNTER — HOSPITAL ENCOUNTER (OUTPATIENT)
Dept: LAB | Facility: MEDICAL CENTER | Age: 73
End: 2018-10-29
Attending: NURSE PRACTITIONER
Payer: MEDICARE

## 2018-10-29 DIAGNOSIS — E07.9 THYROID DISEASE: ICD-10-CM

## 2018-10-29 PROCEDURE — 36415 COLL VENOUS BLD VENIPUNCTURE: CPT

## 2018-10-29 PROCEDURE — 84443 ASSAY THYROID STIM HORMONE: CPT

## 2018-10-30 ENCOUNTER — TELEPHONE (OUTPATIENT)
Dept: MEDICAL GROUP | Facility: PHYSICIAN GROUP | Age: 73
End: 2018-10-30

## 2018-10-30 DIAGNOSIS — E07.9 THYROID DISEASE: ICD-10-CM

## 2018-10-30 DIAGNOSIS — E78.5 HYPERLIPIDEMIA, UNSPECIFIED HYPERLIPIDEMIA TYPE: ICD-10-CM

## 2018-10-30 DIAGNOSIS — I10 ESSENTIAL HYPERTENSION: ICD-10-CM

## 2018-10-30 LAB — TSH SERPL DL<=0.005 MIU/L-ACNC: 0.32 UIU/ML (ref 0.38–5.33)

## 2018-10-30 RX ORDER — LEVOTHYROXINE SODIUM 0.15 MG/1
TABLET ORAL
Qty: 90 TAB | Refills: 1 | Status: SHIPPED | OUTPATIENT
Start: 2018-10-30 | End: 2019-05-28 | Stop reason: SDUPTHER

## 2018-10-30 NOTE — TELEPHONE ENCOUNTER
TSH improved.  Mildly decreased.  Stay on same dose levothyroxine, 150mcg daily.  Will recheck TSH in 4-6 months.

## 2018-11-06 ENCOUNTER — TELEPHONE (OUTPATIENT)
Dept: MEDICAL GROUP | Facility: PHYSICIAN GROUP | Age: 73
End: 2018-11-06

## 2018-11-06 NOTE — TELEPHONE ENCOUNTER
Regarding: lab results  ----- Message from NII Jiménez sent at 11/2/2018 10:35 AM PDT -----       ----- Message sent from NII Jiménez to Lee Echols at 10/30/2018  5:53 PM -----   Malcolm Howard,  Your TSH is just shy of therapeutic range.  Let's keep you on your current dose of levothyroxine (150 mcg) and recheck labs in 4 months or so.  I have sent refill of medication to your pharmacy.  I have sent order for lab work (TSH, CMP, Lipid Profile) to the lab.  You will need to be fasting.  Please make an appointment to see me in 4 months (get labs done before appointment).  Take care,  Berta

## 2018-12-05 RX ORDER — BENAZEPRIL HYDROCHLORIDE 40 MG/1
TABLET ORAL
Qty: 90 TAB | Refills: 1 | Status: SHIPPED | OUTPATIENT
Start: 2018-12-05 | End: 2019-03-19 | Stop reason: SDUPTHER

## 2018-12-05 RX ORDER — HYDROCHLOROTHIAZIDE 25 MG/1
TABLET ORAL
Qty: 90 TAB | Refills: 1 | Status: SHIPPED | OUTPATIENT
Start: 2018-12-05 | End: 2019-07-10 | Stop reason: SDUPTHER

## 2018-12-05 NOTE — TELEPHONE ENCOUNTER
Refill X 6 months, sent to pharmacy.Pt. Seen in the last 6 months per protocol.   Lab Results   Component Value Date/Time    SODIUM 137 04/06/2018 10:33 AM    POTASSIUM 3.9 04/06/2018 10:33 AM    CHLORIDE 103 04/06/2018 10:33 AM    CO2 24 04/06/2018 10:33 AM    GLUCOSE 98 04/06/2018 10:33 AM    BUN 19 04/06/2018 10:33 AM    CREATININE 1.27 04/06/2018 10:33 AM

## 2019-02-05 ENCOUNTER — OFFICE VISIT (OUTPATIENT)
Dept: MEDICAL GROUP | Facility: PHYSICIAN GROUP | Age: 74
End: 2019-02-05
Payer: MEDICARE

## 2019-02-05 VITALS
DIASTOLIC BLOOD PRESSURE: 68 MMHG | BODY MASS INDEX: 29.69 KG/M2 | OXYGEN SATURATION: 97 % | WEIGHT: 207.4 LBS | TEMPERATURE: 97.6 F | HEIGHT: 70 IN | HEART RATE: 70 BPM | SYSTOLIC BLOOD PRESSURE: 112 MMHG | RESPIRATION RATE: 16 BRPM

## 2019-02-05 DIAGNOSIS — M10.09 IDIOPATHIC GOUT OF MULTIPLE SITES, UNSPECIFIED CHRONICITY: ICD-10-CM

## 2019-02-05 DIAGNOSIS — M79.89 SWELLING OF LEFT INDEX FINGER: ICD-10-CM

## 2019-02-05 PROCEDURE — 99214 OFFICE O/P EST MOD 30 MIN: CPT | Performed by: NURSE PRACTITIONER

## 2019-02-05 RX ORDER — COLCHICINE 0.6 MG/1
0.6 TABLET ORAL DAILY
Qty: 30 TAB | Refills: 0 | Status: SHIPPED | OUTPATIENT
Start: 2019-02-05

## 2019-02-05 RX ORDER — AMOXICILLIN 500 MG/1
500 CAPSULE ORAL 3 TIMES DAILY
Qty: 21 CAP | Refills: 0 | Status: SHIPPED | OUTPATIENT
Start: 2019-02-05

## 2019-02-05 ASSESSMENT — PATIENT HEALTH QUESTIONNAIRE - PHQ9: CLINICAL INTERPRETATION OF PHQ2 SCORE: 0

## 2019-02-05 ASSESSMENT — PAIN SCALES - GENERAL: PAINLEVEL: 6=MODERATE PAIN

## 2019-02-05 NOTE — PROGRESS NOTES
CC:  Swollen painful finger    HISTORY OF THE PRESENT ILLNESS: Patient is a 73 y.o. male. This pleasant patient is here today evaluation and management of the following health problems.      Swelling of left index finger  Patient reports swelling and pain in left index finger, onset yesterday.  Unable to bend MIP joint.  Started many years ago with a bump on the MIP joint and reports felt like it was a cyst and that it went away.  Patient reports he has had flares in the past.  Most recent flare started yesterday, and is by far the worst flare he has had.  He does report similar flare about a year ago and took some leftover antibiotics, which seemed to help.  Upon examination today, uncertain if this is a gouty flare.  Patient does have a history of gout.  He will take colchicine and indomethacin.  If symptoms do not improve in the next couple days.  He will start contingent antibiotic.  Will also refer to hand surgery.  Denies fever, hand swelling or erythema.      Allergies: Patient has no known allergies.    Current Outpatient Prescriptions Ordered in [facility]   Medication Sig Dispense Refill   • amoxicillin (AMOXIL) 500 MG Cap Take 1 Cap by mouth 3 times a day. 21 Cap 0   • colchicine (COLCRYS) 0.6 MG Tab Take 1 Tab by mouth every day. 30 Tab 0   • benazepril (LOTENSIN) 40 MG tablet TAKE 1 TABLET BY MOUTH ONCE DAILY 90 Tab 1   • hydroCHLOROthiazide (HYDRODIURIL) 25 MG Tab TAKE 1 TABLET BY MOUTH ONCE DAILY 90 Tab 1   • levothyroxine (SYNTHROID) 150 MCG Tab TAKE 1 TABLET BY MOUTH ONCE DAILY 90 Tab 1   • gabapentin (NEURONTIN) 300 MG Cap Take 1 Cap by mouth 3 times a day. 90 Cap 3   • amLODIPine (NORVASC) 5 MG Tab Take 1 Tab by mouth every day. 90 Tab 1   • TRAMADOL HCL PO Take  by mouth.     • INDOMETHACIN PO Take  by mouth.     • sildenafil citrate (VIAGRA) 100 MG tablet Take 1 Tab by mouth as needed for Erectile Dysfunction. 10 Tab 3     No current Epic-ordered facility-administered medications on file.   "      Past Medical History:   Diagnosis Date   • Hypertension     on meds   • Insomnia    • Thyroid disease     on meds       No past surgical history on file.    Social History   Substance Use Topics   • Smoking status: Never Smoker   • Smokeless tobacco: Never Used   • Alcohol use No       Family History   Problem Relation Age of Onset   • Cancer Father        ROS:   As in HPI, otherwise negative for chest pain, dyspnea, abdominal pain.      Exam: Blood pressure 112/68, pulse 70, temperature 36.4 °C (97.6 °F), temperature source Temporal, resp. rate 16, height 1.778 m (5' 10\"), weight 94.1 kg (207 lb 6.4 oz), SpO2 97 %. Body mass index is 29.76 kg/m².    General: Alert, pleasant, well nourished, well developed male in NAD  Pulmonary: Clear to ausculation.  Normal effort. No rales, ronchi, or wheezing.  Cardiovascular: Normal rate and rhythm without murmur. Carotid and radial pulses are intact and equal bilaterally.    Neurologic: Grossly nonfocal  Left hand: Left index finger MIP joint edematous, mild erythema, tender to palpation, decreased range of motion, sensation grossly intact, radial pulses palpable and equal, brisk capillary refill.  Skin: Warm and dry.    Musculoskeletal: Normal gait.   Psych: Normal mood and affect. Alert and oriented. Judgment and insight is normal.    Please note that this dictation was created using voice recognition software. I have made every reasonable attempt to correct obvious errors, but I expect that there are errors of grammar and possibly content that I did not discover before finalizing the note.      Assessment/Plan  1. Swelling of left index finger  Differential diagnosis include cellulitis, gout, cyst.  Patient will trial colchicine and indomethacin.  If does not start to resolve after 2 days of treatment, patient will start antibiotic.  Will refer to hand surgery as this is recurrent.  - REFERRAL TO HAND SURGERY  - amoxicillin (AMOXIL) 500 MG Cap; Take 1 Cap by mouth 3 " times a day.  Dispense: 21 Cap; Refill: 0  - colchicine (COLCRYS) 0.6 MG Tab; Take 1 Tab by mouth every day.  Dispense: 30 Tab; Refill: 0    2. Idiopathic gout of multiple sites, unspecified chronicity    - colchicine (COLCRYS) 0.6 MG Tab; Take 1 Tab by mouth every day.  Dispense: 30 Tab; Refill: 0        Patient will return to clinic if symptoms do not improve or if they worsen.

## 2019-02-05 NOTE — ASSESSMENT & PLAN NOTE
Patient reports swelling and pain in left index finger, onset yesterday.  Unable to bend MIP joint.  Started many years ago with a bump on the MIP joint and reports felt like it was a cyst and that it went away.  Patient reports he has had flares in the past.  Most recent flare started yesterday, and is by far the worst flare he has had.  He does report similar flare about a year ago and took some leftover antibiotics, which seemed to help.  Upon examination today, uncertain if this is a gouty flare.  Patient does have a history of gout.  He will take colchicine and indomethacin.  If symptoms do not improve in the next couple days.  He will start contingent antibiotic.  Will also refer to hand surgery.  Denies fever, hand swelling or erythema.

## 2019-03-04 RX ORDER — ALPRAZOLAM 1 MG/1
TABLET ORAL
Refills: 0 | OUTPATIENT
Start: 2019-03-04

## 2019-03-04 NOTE — TELEPHONE ENCOUNTER
Please call patient and explain that controlled substances are refilled at appointments only.    Please schedule patient for an appointment and ask him to have fasting labwork done prior to appointment. Thanks

## 2019-03-19 ENCOUNTER — TELEPHONE (OUTPATIENT)
Dept: MEDICAL GROUP | Facility: PHYSICIAN GROUP | Age: 74
End: 2019-03-19

## 2019-03-19 RX ORDER — BENAZEPRIL HYDROCHLORIDE 40 MG/1
TABLET ORAL
Qty: 90 TAB | Refills: 0 | Status: SHIPPED | OUTPATIENT
Start: 2019-03-19 | End: 2019-11-12 | Stop reason: SDUPTHER

## 2019-03-19 NOTE — TELEPHONE ENCOUNTER
Was the patient seen in the last year in this department? Yes    Does patient have an active prescription for medications requested? No     Received Request Via: Pharmacy      Pt met protocol?: Yes    OV 2/19

## 2019-04-15 ENCOUNTER — APPOINTMENT (OUTPATIENT)
Dept: OCCUPATIONAL MEDICINE | Facility: CLINIC | Age: 74
End: 2019-04-15

## 2019-04-15 ENCOUNTER — NON-PROVIDER VISIT (OUTPATIENT)
Dept: URGENT CARE | Facility: PHYSICIAN GROUP | Age: 74
End: 2019-04-15

## 2019-04-15 DIAGNOSIS — Z11.1 PPD SCREENING TEST: ICD-10-CM

## 2019-04-15 PROCEDURE — 86580 TB INTRADERMAL TEST: CPT | Performed by: PHYSICIAN ASSISTANT

## 2019-04-17 ENCOUNTER — NON-PROVIDER VISIT (OUTPATIENT)
Dept: URGENT CARE | Facility: PHYSICIAN GROUP | Age: 74
End: 2019-04-17
Payer: MEDICARE

## 2019-04-17 LAB — TB WHEAL 3D P 5 TU DIAM: NORMAL MM

## 2019-06-10 ENCOUNTER — HOSPITAL ENCOUNTER (OUTPATIENT)
Facility: MEDICAL CENTER | Age: 74
End: 2019-06-10
Attending: NURSE PRACTITIONER
Payer: MEDICARE

## 2019-06-10 PROCEDURE — 82274 ASSAY TEST FOR BLOOD FECAL: CPT

## 2019-06-12 DIAGNOSIS — Z12.11 COLON CANCER SCREENING: ICD-10-CM

## 2019-06-13 LAB
AMBIGUOUS DTTM AMBI4: NORMAL
HEMOCCULT STL QL IA: NEGATIVE

## 2019-07-10 DIAGNOSIS — E07.9 THYROID DISEASE: ICD-10-CM

## 2019-07-10 DIAGNOSIS — R68.82 LOW LIBIDO: ICD-10-CM

## 2019-07-11 RX ORDER — AMLODIPINE BESYLATE 5 MG/1
TABLET ORAL
Qty: 90 TAB | Refills: 0 | Status: SHIPPED | OUTPATIENT
Start: 2019-07-11 | End: 2019-12-04 | Stop reason: SDUPTHER

## 2019-07-11 RX ORDER — HYDROCHLOROTHIAZIDE 25 MG/1
TABLET ORAL
Qty: 90 TAB | Refills: 0 | Status: SHIPPED | OUTPATIENT
Start: 2019-07-11 | End: 2019-12-04 | Stop reason: SDUPTHER

## 2019-07-11 NOTE — TELEPHONE ENCOUNTER
Was the patient seen in the last year in this department? Yes    Does patient have an active prescription for medications requested? No     Received Request Via: Pharmacy      Pt met protocol?: Yes, OV 2/19   BP Readings from Last 1 Encounters:   02/05/19 112/68

## 2019-10-18 DIAGNOSIS — E07.9 THYROID DISEASE: ICD-10-CM

## 2019-10-18 RX ORDER — LEVOTHYROXINE SODIUM 0.15 MG/1
TABLET ORAL
Qty: 30 TAB | Refills: 0 | Status: SHIPPED | OUTPATIENT
Start: 2019-10-18 | End: 2019-11-12 | Stop reason: SDUPTHER

## 2019-12-04 DIAGNOSIS — R68.82 LOW LIBIDO: ICD-10-CM

## 2019-12-04 DIAGNOSIS — E07.9 THYROID DISEASE: ICD-10-CM

## 2019-12-05 RX ORDER — AMLODIPINE BESYLATE 5 MG/1
TABLET ORAL
Qty: 30 TAB | Refills: 0 | Status: SHIPPED | OUTPATIENT
Start: 2019-12-05

## 2019-12-05 RX ORDER — HYDROCHLOROTHIAZIDE 25 MG/1
TABLET ORAL
Qty: 30 TAB | Refills: 0 | Status: SHIPPED | OUTPATIENT
Start: 2019-12-05

## 2019-12-06 NOTE — TELEPHONE ENCOUNTER
Please remind pt to get labs done, he was told 11/19. Will send 30 days ot pharmacy and next request will be denied.

## 2022-07-06 NOTE — ASSESSMENT & PLAN NOTE
This is a chronic health problem that is well controlled with current medications and lifestyle measures.  Patient takes benazepril 40 mg, amlodipine 5 mg, and hydrochlorothiazide 25 mg daily.  Patient is requesting refill for amlodipine, needing a paper prescription as he mails prescription.The patient denies chest pain, shortness of breath, headache, epistaxis, vision changes, or dyspnea on exertion.  Tolerating medication, denies lightheadedness, cough, pedal edema.   105.7